# Patient Record
Sex: FEMALE | NOT HISPANIC OR LATINO | URBAN - METROPOLITAN AREA
[De-identification: names, ages, dates, MRNs, and addresses within clinical notes are randomized per-mention and may not be internally consistent; named-entity substitution may affect disease eponyms.]

---

## 2024-10-21 ENCOUNTER — TELEPHONE (OUTPATIENT)
Dept: OBSTETRICS AND GYNECOLOGY | Facility: CLINIC | Age: 34
End: 2024-10-21
Payer: COMMERCIAL

## 2024-10-21 ENCOUNTER — OFFICE VISIT (OUTPATIENT)
Dept: FAMILY MEDICINE | Facility: CLINIC | Age: 34
End: 2024-10-21
Payer: COMMERCIAL

## 2024-10-21 VITALS
HEIGHT: 63 IN | OXYGEN SATURATION: 95 % | HEART RATE: 66 BPM | SYSTOLIC BLOOD PRESSURE: 110 MMHG | WEIGHT: 130.31 LBS | BODY MASS INDEX: 23.09 KG/M2 | DIASTOLIC BLOOD PRESSURE: 72 MMHG

## 2024-10-21 DIAGNOSIS — F32.81 PREMENSTRUAL DYSPHORIC DISORDER: ICD-10-CM

## 2024-10-21 DIAGNOSIS — E34.9 HORMONE IMBALANCE: ICD-10-CM

## 2024-10-21 DIAGNOSIS — N94.6 DYSMENORRHEA: ICD-10-CM

## 2024-10-21 DIAGNOSIS — Z00.00 ENCOUNTER FOR GENERAL ADULT MEDICAL EXAMINATION W/O ABNORMAL FINDINGS: Primary | ICD-10-CM

## 2024-10-21 PROBLEM — Z02.3 ENCOUNTER FOR EXAMINATION FOR RECRUITMENT TO ARMED FORCES: Status: RESOLVED | Noted: 2024-10-21 | Resolved: 2024-10-21

## 2024-10-21 PROBLEM — Z20.2 EXPOSURE TO STD: Status: RESOLVED | Noted: 2024-10-21 | Resolved: 2024-10-21

## 2024-10-21 PROBLEM — M25.579 PAIN IN JOINT, ANKLE AND FOOT: Status: ACTIVE | Noted: 2024-10-21

## 2024-10-21 PROBLEM — M25.569 ARTHRALGIA OF KNEE: Status: ACTIVE | Noted: 2024-10-21

## 2024-10-21 PROBLEM — Z30.41 USES ORAL CONTRACEPTIVES: Status: RESOLVED | Noted: 2024-10-21 | Resolved: 2024-10-21

## 2024-10-21 PROBLEM — H52.209 ASTIGMATISM: Status: ACTIVE | Noted: 2024-10-21

## 2024-10-21 PROBLEM — H52.10 MYOPIA: Status: ACTIVE | Noted: 2024-10-21

## 2024-10-21 PROBLEM — M54.9 UPPER BACK PAIN: Status: RESOLVED | Noted: 2024-10-21 | Resolved: 2024-10-21

## 2024-10-21 PROBLEM — H04.129 DRY EYE SYNDROME: Status: ACTIVE | Noted: 2024-10-21

## 2024-10-21 PROBLEM — R30.0 DYSURIA: Status: RESOLVED | Noted: 2024-10-21 | Resolved: 2024-10-21

## 2024-10-21 PROBLEM — J30.81 ALLERGIC RHINITIS DUE TO ANIMALS: Status: ACTIVE | Noted: 2024-10-21

## 2024-10-21 PROBLEM — F17.201 NICOTINE DEPENDENCE IN REMISSION: Status: RESOLVED | Noted: 2024-10-21 | Resolved: 2024-10-21

## 2024-10-21 PROCEDURE — 99999 PR PBB SHADOW E&M-NEW PATIENT-LVL IV: CPT | Mod: PBBFAC,,,

## 2024-10-21 RX ORDER — LISDEXAMFETAMINE DIMESYLATE 30 MG/1
30 CAPSULE ORAL EVERY MORNING
COMMUNITY

## 2024-10-21 RX ORDER — CETIRIZINE HYDROCHLORIDE 10 MG/1
10 TABLET ORAL DAILY
COMMUNITY

## 2024-10-21 RX ORDER — CLONAZEPAM 1 MG/1
1 TABLET ORAL NIGHTLY PRN
COMMUNITY
Start: 2024-07-23

## 2024-10-21 RX ORDER — AZELASTINE HYDROCHLORIDE 0.5 MG/ML
1 SOLUTION/ DROPS OPHTHALMIC 2 TIMES DAILY
COMMUNITY
Start: 2024-09-04

## 2024-10-21 NOTE — PROGRESS NOTES
Name: Boo Ross  MRN: [id number]  : 1990    Subjective   HPI    Melissa is a pleasant 34 year old female who presents to the clinic to establish care and discuss hormone problems. She reports that she has had hormone imbalance since delivering her twins 2.5 years ago. She cycles through mood swings and finds them to be consistent with her menstrual cycle. Her periods have been irregular since her last delivery. She becomes very fatigued days prior to her period, then cycles through anger and irritability. It is affecting her quality of life. She does not feel she gets enough rest. She has seen an ObGyn and a Psychiatrist that did not help her find a solution. The psychiatrist considered bipolar however she does not feel that she meets manic behavior. She does not want to take medication. She considers endometriosis and possibly abdominal discomfort could worsen her irritability and anger. She is agreeable to see specialist and ObGyn again to consider hormone testing or other solution. She declines psychology or [facility] referral.   Otherwise she is healthy.     Review of Systems    Patient Active Problem List   Diagnosis    Allergic rhinitis due to animals    Dry eye syndrome    Dysmenorrhea    Astigmatism    Pain in joint, ankle and foot    Myopia    Arthralgia of knee       Current Outpatient Medications on File Prior to Visit   Medication Sig Dispense Refill    azelastine (OPTIVAR) 0.05 % ophthalmic solution Place 1 drop into both eyes 2 (two) times daily.      cetirizine (ZYRTEC) 10 MG tablet Take 10 mg by mouth once daily.      clonazePAM (KLONOPIN) 1 MG tablet Take 1 mg by mouth nightly as needed.      lisdexamfetamine (VYVANSE) 30 MG capsule Take 30 mg by mouth every morning.       No current facility-administered medications on file prior to visit.       Past Medical History:   Diagnosis Date    Dysuria 10/21/2024    Encounter for examination for recruitment to [employer] 10/21/2024    Exposure to  STD 10/21/2024    Upper back pain 10/21/2024    Uses oral contraceptives 10/21/2024       No past surgical history on file.     No family history on file.    Social History     Socioeconomic History    Marital status:    Tobacco Use    Smoking status: Never    Smokeless tobacco: Never     Social Drivers of Health     Financial Resource Strain: Low Risk  (10/20/2024)    Overall Financial Resource Strain (CARDIA)     Difficulty of Paying Living Expenses: Not hard at all   Food Insecurity: No Food Insecurity (10/20/2024)    Hunger Vital Sign     Worried About Running Out of Food in the Last Year: Never true     Ran Out of Food in the Last Year: Never true   Physical Activity: Sufficiently Active (10/20/2024)    Exercise Vital Sign     Days of Exercise per Week: 5 days     Minutes of Exercise per Session: 60 min   Stress: Stress Concern Present (10/20/2024)    [employer] - Occupational Stress Questionnaire     Feeling of Stress : To some extent   Housing Stability: Unknown (10/20/2024)    Housing Stability Vital Sign     Unable to Pay for Housing in the Last Year: No       Review of patient's allergies indicates:  No Known Allergies     Health Maintenance Due   Topic Date Due    Hepatitis C Screening  Never done    Cervical Cancer Screening  Never done    Lipid Panel  Never done    HIV Screening  Never done    TETANUS VACCINE  10/03/2018    Influenza Vaccine (1) 09/01/2024    COVID-19 Vaccine (1 - 2024-25 season) Never done       Objective   Vitals:    10/21/24 1300   BP: 110/72   Pulse: 66        Physical Exam  Vitals and nursing note reviewed.   Constitutional:       General: She is not in acute distress.     Appearance: Normal appearance. She is not ill-appearing or toxic-appearing.   HENT:      Head: Normocephalic and atraumatic.      Nose: Nose normal.      Mouth/Throat:      Mouth: Mucous membranes are moist.   Eyes:      Extraocular Movements: Extraocular movements intact.    Cardiovascular:      Rate and Rhythm: Normal rate and regular rhythm.      Heart sounds: Normal heart sounds. No murmur heard.     No friction rub. No gallop.   Pulmonary:      Effort: Pulmonary effort is normal.      Breath sounds: Normal breath sounds. No wheezing, rhonchi or rales.   Abdominal:      General: Bowel sounds are normal.      Tenderness: There is no abdominal tenderness. There is no guarding.   Musculoskeletal:      Cervical back: Neck supple.   Lymphadenopathy:      Cervical: No cervical adenopathy.   Skin:     General: Skin is warm.   Neurological:      Mental Status: She is alert and oriented to person, place, and time.   Psychiatric:         Mood and Affect: Mood normal.         Behavior: Behavior normal.          Assessment & Plan   1. Encounter for general adult medical examination w/o abnormal findings  -     Comprehensive Metabolic Panel; Future; Expected date: 10/21/2024  -     TSH; Future; Expected date: 10/21/2024  -     Hemoglobin A1C; Future; Expected date: 10/21/2024  -     Lipid Panel; Future; Expected date: 10/21/2024  -     CBC Auto Differential; Future; Expected date: 10/21/2024    2. Hormone imbalance  -     Ambulatory referral/consult to Endocrinology; Future; Expected date: 10/28/2024  -     Ambulatory referral/consult to Obstetrics / Gynecology; Future; Expected date: 10/28/2024    3. Dysmenorrhea  -     Ambulatory referral/consult to Endocrinology; Future; Expected date: 10/28/2024  -     Ambulatory referral/consult to Obstetrics / Gynecology; Future; Expected date: 10/28/2024    4. Premenstrual dysphoric disorder  -     Ambulatory referral/consult to Endocrinology; Future; Expected date: 10/28/2024  -     Ambulatory referral/consult to Obstetrics / Gynecology; Future; Expected date: 10/28/2024        Follow up in about 2 months (around 12/21/2024), or if symptoms worsen or fail to improve.    Ana Agustin MD  10/22/2024

## 2024-10-25 ENCOUNTER — LAB VISIT (OUTPATIENT)
Dept: LAB | Facility: HOSPITAL | Age: 34
End: 2024-10-25
Payer: COMMERCIAL

## 2024-10-25 DIAGNOSIS — Z00.00 ENCOUNTER FOR GENERAL ADULT MEDICAL EXAMINATION W/O ABNORMAL FINDINGS: ICD-10-CM

## 2024-10-25 LAB
ALBUMIN SERPL BCP-MCNC: 4.2 G/DL (ref 3.5–5.2)
ALP SERPL-CCNC: 31 U/L (ref 40–150)
ALT SERPL W/O P-5'-P-CCNC: 19 U/L (ref 10–44)
ANION GAP SERPL CALC-SCNC: 9 MMOL/L (ref 8–16)
AST SERPL-CCNC: 20 U/L (ref 10–40)
BASOPHILS # BLD AUTO: 0.02 K/UL (ref 0–0.2)
BASOPHILS NFR BLD: 0.4 % (ref 0–1.9)
BILIRUB SERPL-MCNC: 1 MG/DL (ref 0.1–1)
BUN SERPL-MCNC: 11 MG/DL (ref 6–20)
CALCIUM SERPL-MCNC: 8.8 MG/DL (ref 8.7–10.5)
CHLORIDE SERPL-SCNC: 105 MMOL/L (ref 95–110)
CHOLEST SERPL-MCNC: 125 MG/DL (ref 120–199)
CHOLEST/HDLC SERPL: 2.7 {RATIO} (ref 2–5)
CO2 SERPL-SCNC: 24 MMOL/L (ref 23–29)
CREAT SERPL-MCNC: 0.8 MG/DL (ref 0.5–1.4)
DIFFERENTIAL METHOD BLD: ABNORMAL
EOSINOPHIL # BLD AUTO: 0.2 K/UL (ref 0–0.5)
EOSINOPHIL NFR BLD: 4.1 % (ref 0–8)
ERYTHROCYTE [DISTWIDTH] IN BLOOD BY AUTOMATED COUNT: 12.4 % (ref 11.5–14.5)
EST. GFR  (NO RACE VARIABLE): >60 ML/MIN/1.73 M^2
ESTIMATED AVG GLUCOSE: 88 MG/DL (ref 68–131)
GLUCOSE SERPL-MCNC: 93 MG/DL (ref 70–110)
HBA1C MFR BLD: 4.7 % (ref 4–5.6)
HCT VFR BLD AUTO: 36.9 % (ref 37–48.5)
HDLC SERPL-MCNC: 46 MG/DL (ref 40–75)
HDLC SERPL: 36.8 % (ref 20–50)
HGB BLD-MCNC: 12.8 G/DL (ref 12–16)
IMM GRANULOCYTES # BLD AUTO: 0.03 K/UL (ref 0–0.04)
IMM GRANULOCYTES NFR BLD AUTO: 0.6 % (ref 0–0.5)
LDLC SERPL CALC-MCNC: 65.6 MG/DL (ref 63–159)
LYMPHOCYTES # BLD AUTO: 2.3 K/UL (ref 1–4.8)
LYMPHOCYTES NFR BLD: 45.2 % (ref 18–48)
MCH RBC QN AUTO: 31.8 PG (ref 27–31)
MCHC RBC AUTO-ENTMCNC: 34.7 G/DL (ref 32–36)
MCV RBC AUTO: 92 FL (ref 82–98)
MONOCYTES # BLD AUTO: 0.4 K/UL (ref 0.3–1)
MONOCYTES NFR BLD: 7.9 % (ref 4–15)
NEUTROPHILS # BLD AUTO: 2.2 K/UL (ref 1.8–7.7)
NEUTROPHILS NFR BLD: 41.8 % (ref 38–73)
NONHDLC SERPL-MCNC: 79 MG/DL
NRBC BLD-RTO: 0 /100 WBC
PLATELET # BLD AUTO: 244 K/UL (ref 150–450)
PMV BLD AUTO: 9.1 FL (ref 9.2–12.9)
POTASSIUM SERPL-SCNC: 3.9 MMOL/L (ref 3.5–5.1)
PROT SERPL-MCNC: 7.3 G/DL (ref 6–8.4)
RBC # BLD AUTO: 4.02 M/UL (ref 4–5.4)
SODIUM SERPL-SCNC: 138 MMOL/L (ref 136–145)
TRIGL SERPL-MCNC: 67 MG/DL (ref 30–150)
TSH SERPL DL<=0.005 MIU/L-ACNC: 2.12 UIU/ML (ref 0.4–4)
WBC # BLD AUTO: 5.18 K/UL (ref 3.9–12.7)

## 2024-10-25 PROCEDURE — 85025 COMPLETE CBC W/AUTO DIFF WBC: CPT

## 2024-10-25 PROCEDURE — 84443 ASSAY THYROID STIM HORMONE: CPT

## 2024-10-25 PROCEDURE — 80061 LIPID PANEL: CPT

## 2024-10-25 PROCEDURE — 80053 COMPREHEN METABOLIC PANEL: CPT

## 2024-10-25 PROCEDURE — 36415 COLL VENOUS BLD VENIPUNCTURE: CPT | Mod: PO

## 2024-10-25 PROCEDURE — 83036 HEMOGLOBIN GLYCOSYLATED A1C: CPT

## 2024-11-13 ENCOUNTER — OFFICE VISIT (OUTPATIENT)
Dept: OBSTETRICS AND GYNECOLOGY | Facility: CLINIC | Age: 34
End: 2024-11-13
Payer: COMMERCIAL

## 2024-11-13 VITALS
HEIGHT: 63 IN | DIASTOLIC BLOOD PRESSURE: 60 MMHG | WEIGHT: 130.06 LBS | BODY MASS INDEX: 23.04 KG/M2 | SYSTOLIC BLOOD PRESSURE: 94 MMHG

## 2024-11-13 DIAGNOSIS — F32.81 PREMENSTRUAL DYSPHORIC DISORDER: ICD-10-CM

## 2024-11-13 DIAGNOSIS — Z30.432 ENCOUNTER FOR IUD REMOVAL: Primary | ICD-10-CM

## 2024-11-13 DIAGNOSIS — N94.6 DYSMENORRHEA: ICD-10-CM

## 2024-11-13 DIAGNOSIS — E34.9 HORMONE IMBALANCE: ICD-10-CM

## 2024-11-13 PROCEDURE — 99999 PR PBB SHADOW E&M-EST. PATIENT-LVL III: CPT | Mod: PBBFAC,,, | Performed by: STUDENT IN AN ORGANIZED HEALTH CARE EDUCATION/TRAINING PROGRAM

## 2024-11-13 RX ORDER — DROSPIRENONE AND ESTETROL 3-14.2(28)
1 KIT ORAL DAILY
Qty: 28 TABLET | Refills: 12 | Status: SHIPPED | OUTPATIENT
Start: 2024-11-13

## 2024-11-13 NOTE — PROCEDURES
Removal of IUD    Date/Time: 11/13/2024 2:40 PM    Performed by: Hansa Marshall MD  Authorized by: Hansa Marshall MD    Consent given by:  Patient  Procedure risks and benefits discussed: yes    Patient questions answered: yes    Patient agrees, verbalizes understanding, and wants to proceed: yes    Educational handouts given: yes    Instructions and paperwork completed: yes    Implant grasped by: ring forceps  Removal due to mechanical complications: yes    Removed with no complications: yes

## 2024-11-13 NOTE — PROGRESS NOTES
History & Physical  Gynecology      SUBJECTIVE:     Chief Complaint: Establish Care, Hormone Imbalance, PMDD, and Dysmenorrhea       History of Present Illness:    Here today to discuss heavy and painful periods, IUD, and PMDD.     Periods are regular and heavier since copper IUD. + painful. Interested in copper IUD removal as partner did get vasectomy. Planning vasectomy for contraception    Patient has previously been diagnosed with PMDD. After ovulation through luteal phase and period + rage, mood swings, doesn't like family. Affecting home life and relationship with kids. Has tried CALEB and 2 SRRI. Didn't like side effects with either.       Review of patient's allergies indicates:  No Known Allergies    Past Medical History:   Diagnosis Date    Abnormal Pap smear of cervix     Dysuria 10/21/2024    Encounter for examination for recruitment to [employer] 10/21/2024    Exposure to STD 10/21/2024    Upper back pain 10/21/2024    Uses oral contraceptives 10/21/2024     Past Surgical History:   Procedure Laterality Date    AUGMENTATION OF BREAST Bilateral     LAPAROSCOPY N/A     RHINOPLASTY       OB History          2    Para   2    Term   2            AB        Living   3         SAB        IAB        Ectopic        Multiple   1    Live Births   3               Family History   Problem Relation Name Age of Onset    Breast cancer Paternal Aunt      Colon cancer Neg Hx      Ovarian cancer Neg Hx      Uterine cancer Neg Hx      Cervical cancer Neg Hx       Social History     Tobacco Use    Smoking status: Never    Smokeless tobacco: Never   Substance Use Topics    Alcohol use: Yes     Comment: socially    Drug use: Never       Current Outpatient Medications   Medication Sig    azelastine (OPTIVAR) 0.05 % ophthalmic solution Place 1 drop into both eyes 2 (two) times daily.    cetirizine (ZYRTEC) 10 MG tablet Take 10 mg by mouth once daily.    clonazePAM (KLONOPIN) 1 MG tablet Take 1 mg by mouth nightly  as needed.    lisdexamfetamine (VYVANSE) 30 MG capsule Take 30 mg by mouth every morning.    drospirenone-estetrol (NEXTSTELLIS) 3 mg- 14.2 mg (28) Tab Take 1 each by mouth Daily.     No current facility-administered medications for this visit.         Review of Systems:  Review of Systems   Constitutional:  Negative for chills, fatigue and fever.   HENT:  Negative for congestion.    Eyes:  Negative for visual disturbance.   Respiratory:  Negative for cough and shortness of breath.    Cardiovascular:  Negative for chest pain and palpitations.   Gastrointestinal:  Negative for abdominal distention, abdominal pain, constipation, diarrhea, nausea and vomiting.   Genitourinary:  Negative for difficulty urinating, dysuria, hematuria, vaginal bleeding and vaginal discharge.   Skin:  Negative for rash.   Neurological:  Negative for dizziness, seizures, light-headedness and headaches.   Hematological:  Does not bruise/bleed easily.   Psychiatric/Behavioral:  Negative for dysphoric mood. The patient is not nervous/anxious.         OBJECTIVE:     Physical Exam:  Physical Exam  Vitals reviewed.   Constitutional:       General: She is not in acute distress.     Appearance: Normal appearance. She is well-developed.   HENT:      Head: Normocephalic and atraumatic.   Cardiovascular:      Rate and Rhythm: Normal rate and regular rhythm.   Pulmonary:      Effort: Pulmonary effort is normal.   Abdominal:      General: There is no distension.      Palpations: Abdomen is soft.   Genitourinary:     Vagina: Normal.      Comments: Normal external female genitalia, normal hair distribution. Vaginal mucosa pink, moist, well rugated, scant white physiologic discharge. No blood in vault. Cervix pink, non-friable, without lesion.  Skin:     General: Skin is warm.   Neurological:      Mental Status: She is alert and oriented to person, place, and time.   Psychiatric:         Behavior: Behavior normal.         Thought Content: Thought content  normal.         Judgment: Judgment normal.           ASSESSMENT:       ICD-10-CM ICD-9-CM    1. Encounter for IUD removal  Z30.432 V25.12       2. Hormone imbalance  E34.9 259.9 Ambulatory referral/consult to Obstetrics / Gynecology      drospirenone-estetrol (NEXTSTELLIS) 3 mg- 14.2 mg (28) Tab      3. Dysmenorrhea  N94.6 625.3 Ambulatory referral/consult to Obstetrics / Gynecology      drospirenone-estetrol (NEXTSTELLIS) 3 mg- 14.2 mg (28) Tab      4. Premenstrual dysphoric disorder  F32.81 625.4 Ambulatory referral/consult to Obstetrics / Gynecology      drospirenone-estetrol (NEXTSTELLIS) 3 mg- 14.2 mg (28) Tab          No orders of the defined types were placed in this encounter.          Plan:      IUD removed see procedure note    PMDD  - long discussion with patient regarding hormonal options vs non hormonal options for PMDD  - can consider OCPs, IUD, orlissa, myfembee.  - had questions about endometrial ablation, answered  - discussed chasteberry, calcium , magnesium bisgycinate , anti inflammatory plant based diet  - r/b/a dicussed, will try nexstelllis continously x 3 months    F/u in 3 months    aHnsa Marshall M.D.  Obstetrics and Gynecology

## 2025-01-31 ENCOUNTER — PATIENT MESSAGE (OUTPATIENT)
Dept: OBSTETRICS AND GYNECOLOGY | Facility: CLINIC | Age: 35
End: 2025-01-31
Payer: COMMERCIAL

## 2025-01-31 DIAGNOSIS — E34.9 HORMONE IMBALANCE: ICD-10-CM

## 2025-01-31 DIAGNOSIS — F32.81 PREMENSTRUAL DYSPHORIC DISORDER: ICD-10-CM

## 2025-01-31 DIAGNOSIS — N94.6 DYSMENORRHEA: ICD-10-CM

## 2025-02-10 ENCOUNTER — OFFICE VISIT (OUTPATIENT)
Dept: ENDOCRINOLOGY | Facility: CLINIC | Age: 35
End: 2025-02-10
Payer: COMMERCIAL

## 2025-02-10 DIAGNOSIS — F32.81 PREMENSTRUAL DYSPHORIC DISORDER: ICD-10-CM

## 2025-02-10 DIAGNOSIS — E34.9 HORMONE IMBALANCE: ICD-10-CM

## 2025-02-10 DIAGNOSIS — R23.2 FLUSHING: Primary | ICD-10-CM

## 2025-02-10 DIAGNOSIS — N94.6 DYSMENORRHEA: ICD-10-CM

## 2025-02-10 PROBLEM — O30.043 DICHORIONIC DIAMNIOTIC TWIN PREGNANCY IN THIRD TRIMESTER: Status: ACTIVE | Noted: 2022-05-13

## 2025-02-10 PROBLEM — Z98.82 H/O BREAST AUGMENTATION: Status: ACTIVE | Noted: 2020-03-16

## 2025-02-10 NOTE — PROGRESS NOTES
The patient location is: Home    Visit type: audiovisual    Face to Face time with patient: 20 mins    45 minutes of total time spent on the encounter, which includes face to face time and non-face to face time preparing to see the patient (eg, review of tests), Obtaining and/or reviewing separately obtained history, Documenting clinical information in the electronic or other health record, Independently interpreting results (not separately reported) and communicating results to the patient/family/caregiver, or Care coordination (not separately reported).     Each patient to whom he or she provides medical services by telemedicine is:  (1) informed of the relationship between the physician and patient and the respective role of any other health care provider with respect to management of the patient; and (2) notified that he or she may decline to receive medical services by telemedicine and may withdraw from such care at any time.    Notes:      Subjective:      Patient ID: Sandra Lauren is referred by Mary Beth Paul MD     Chief Complaint:  PMDD, Dysmenorrhea, hormone imbalance.     History of Present Illness    Sandra Lauren is a 34 y.o. female who presents for evaluation of PMDD, Dysmenorrhea, hormone imbalance.     Seen OBGYN: H/o heavy and painful periods, IUD, and PMDD.   Periods were regular and heavier since copper IUD with dysmenorrhea. Had copper IUD removal as partner did get vasectomy.      Patient has previously been diagnosed with PMDD at 14 years. Has tried CALEB and 2 SRRI, d/c due to side effects.   Was prescribed OCP by her obgyn.     Patient says the IUD removal helped with the pain.   She has panic attacks/anxiety during her periods.   H/o ADHD   On Vyvanse during the weekdays  Klonipin as needed.    H/o chronic fatigue, feels bloated.   Reports diarrhea/loose bowels, night sweats/flushing alta. during her cycles.        Lab Results   Component Value Date    TSH 2.120 10/25/2024        Lipid panel    Lab Results   Component Value Date    CHOL 125 10/25/2024    TRIG 67 10/25/2024    HDL 46 10/25/2024    LDLCALC 65.6 10/25/2024    CHOLHDL 36.8 10/25/2024        Family Hx:   Diabetes: Maternal GM  Brother: Melanoma    Pre diabetes    Lab Results   Component Value Date    HGBA1C 4.7 10/25/2024         ROS:   As above    Objective:     There were no vitals taken for this visit.  There is no height or weight on file to calculate BMI.    Physical Exam  Constitutional:       General: She is not in acute distress.     Appearance: She is not ill-appearing.   Eyes:      Conjunctiva/sclera: Conjunctivae normal.   Pulmonary:      Effort: Pulmonary effort is normal.   Neurological:      Mental Status: She is alert and oriented to person, place, and time.           Lab Review:   Lab Results   Component Value Date    HGBA1C 4.7 10/25/2024     Lab Results   Component Value Date    CHOL 125 10/25/2024    HDL 46 10/25/2024    LDLCALC 65.6 10/25/2024    TRIG 67 10/25/2024    CHOLHDL 36.8 10/25/2024     Lab Results   Component Value Date     10/25/2024    K 3.9 10/25/2024     10/25/2024    CO2 24 10/25/2024    GLU 93 10/25/2024    BUN 11 10/25/2024    CREATININE 0.8 10/25/2024    CALCIUM 8.8 10/25/2024    PROT 7.3 10/25/2024    ALBUMIN 4.2 10/25/2024    BILITOT 1.0 10/25/2024    ALKPHOS 31 (L) 10/25/2024    AST 20 10/25/2024    ALT 19 10/25/2024    ANIONGAP 9 10/25/2024    EGFRNORACEVR >60.0 10/25/2024    TSH 2.120 10/25/2024        Assessment and Plan     1. Premenstrual dysphoric disorder  Assessment & Plan:    Continue follow up with OBGYN.       Orders:  -     Ambulatory referral/consult to Endocrinology    2. Hormone imbalance  Assessment & Plan:    Patient reports menorrhagia, dysmenorrhea, PMDD, anxiety.   Has tried CALEB, SSRI but had side effects.   Wants to know if there is any other evaluation or options for her PMDD.   Currently on OCP, takes vyvanse on weekdays and klonopin as needed.    Reports chronic fatigue, loose stools, night sweats, flushing alta during her cycles.   Discussed checking her TFTs, metanephrine and cortisol.       Orders:  -     Ambulatory referral/consult to Endocrinology    3. Dysmenorrhea  Assessment & Plan:    Reports some improvment after IUD removal.   On OCP.   Follow up with obgyn.       Orders:  -     Ambulatory referral/consult to Endocrinology       Follow up after labs.     Macie DRAPER Rai, MD

## 2025-02-11 RX ORDER — DROSPIRENONE AND ESTETROL 3-14.2(28)
1 KIT ORAL DAILY
Qty: 28 TABLET | Refills: 12 | Status: SHIPPED | OUTPATIENT
Start: 2025-02-11

## 2025-02-16 PROBLEM — E34.9 HORMONE IMBALANCE: Status: ACTIVE | Noted: 2025-02-16

## 2025-02-16 PROBLEM — F32.81 PREMENSTRUAL DYSPHORIC DISORDER: Status: ACTIVE | Noted: 2025-02-16

## 2025-02-16 NOTE — ASSESSMENT & PLAN NOTE
Patient reports menorrhagia, dysmenorrhea, PMDD, anxiety.   Has tried CALEB, SSRI but had side effects.   Wants to know if there is any other evaluation or options for her PMDD.   Currently on OCP, takes vyvanse on weekdays and klonopin as needed.   Reports chronic fatigue, loose stools, night sweats, flushing alta during her cycles.   Discussed checking her TFTs, metanephrine and cortisol.

## 2025-02-18 ENCOUNTER — LAB VISIT (OUTPATIENT)
Dept: LAB | Facility: HOSPITAL | Age: 35
End: 2025-02-18
Attending: INTERNAL MEDICINE
Payer: COMMERCIAL

## 2025-02-18 DIAGNOSIS — R23.2 FLUSHING: ICD-10-CM

## 2025-02-18 LAB
CORTIS SERPL-MCNC: 14.3 UG/DL (ref 4.3–22.4)
T4 FREE SERPL-MCNC: 1.02 NG/DL (ref 0.71–1.51)
TSH SERPL DL<=0.005 MIU/L-ACNC: 2.87 UIU/ML (ref 0.4–4)

## 2025-02-18 PROCEDURE — 84443 ASSAY THYROID STIM HORMONE: CPT | Performed by: INTERNAL MEDICINE

## 2025-02-18 PROCEDURE — 83835 ASSAY OF METANEPHRINES: CPT | Performed by: INTERNAL MEDICINE

## 2025-02-18 PROCEDURE — 84439 ASSAY OF FREE THYROXINE: CPT | Performed by: INTERNAL MEDICINE

## 2025-02-18 PROCEDURE — 82533 TOTAL CORTISOL: CPT | Performed by: INTERNAL MEDICINE

## 2025-02-20 ENCOUNTER — OFFICE VISIT (OUTPATIENT)
Dept: OBSTETRICS AND GYNECOLOGY | Facility: CLINIC | Age: 35
End: 2025-02-20
Payer: COMMERCIAL

## 2025-02-20 VITALS — DIASTOLIC BLOOD PRESSURE: 72 MMHG | WEIGHT: 132.5 LBS | SYSTOLIC BLOOD PRESSURE: 106 MMHG | BODY MASS INDEX: 23.47 KG/M2

## 2025-02-20 DIAGNOSIS — F32.81 PMDD (PREMENSTRUAL DYSPHORIC DISORDER): Primary | ICD-10-CM

## 2025-02-20 RX ORDER — PROGESTERONE 200 MG/1
200 CAPSULE ORAL NIGHTLY
Qty: 15 CAPSULE | Refills: 4 | Status: SHIPPED | OUTPATIENT
Start: 2025-02-20 | End: 2025-03-07

## 2025-02-20 NOTE — PROGRESS NOTES
"History & Physical  Gynecology      SUBJECTIVE:     Chief Complaint: Birth Control Follow-Up  (Patient states that she is no longer taking BC )       History of Present Illness:    Here today for f/u of PMDD and luteal phase issues. Took 1 month of birth control and felt crazy hormonal and worse mood wise, stopped it. Still having issues especially after ovulation.     Previous HPI  Patient has previously been diagnosed with PMDD. After ovulation through luteal phase and period + rage, mood swings, doesn't like family. Affecting home life and relationship with kids. Has tried CALEB and 2 SRRI. Didn't like side effects with either. "Mommy only has 2 good weeks"      Review of patient's allergies indicates:   Allergen Reactions    Cat/feline products Hives, Itching, Rash and Shortness Of Breath       Past Medical History:   Diagnosis Date    Abnormal Pap smear of cervix     Dysuria 10/21/2024    Encounter for examination for recruitment to [employer] 10/21/2024    Exposure to STD 10/21/2024    Hormone imbalance 2025    Upper back pain 10/21/2024    Uses oral contraceptives 10/21/2024     Past Surgical History:   Procedure Laterality Date    AUGMENTATION OF BREAST Bilateral     LAPAROSCOPY N/A     RHINOPLASTY       OB History          2    Para   2    Term   2            AB        Living   3         SAB        IAB        Ectopic        Multiple   1    Live Births   3               Family History   Problem Relation Name Age of Onset    Breast cancer Paternal Aunt      Colon cancer Neg Hx      Ovarian cancer Neg Hx      Uterine cancer Neg Hx      Cervical cancer Neg Hx       Social History[1]    Current Outpatient Medications   Medication Sig    azelastine (OPTIVAR) 0.05 % ophthalmic solution Place 1 drop into both eyes 2 (two) times daily.    cetirizine (ZYRTEC) 10 MG tablet Take 10 mg by mouth once daily.    clonazePAM (KLONOPIN) 1 MG tablet Take 1 mg by mouth nightly as needed.    lisdexamfetamine " (VYVANSE) 30 MG capsule Take 30 mg by mouth every morning.    drospirenone-estetrol (NEXTSTELLIS) 3 mg- 14.2 mg (28) Tab Take 1 each by mouth Daily. (Patient not taking: Reported on 2/20/2025)    progesterone (PROMETRIUM) 200 MG capsule Take 1 capsule (200 mg total) by mouth nightly. for 15 days     No current facility-administered medications for this visit.         Review of Systems:  Review of Systems   Constitutional:  Negative for chills, fatigue and fever.   HENT:  Negative for congestion.    Eyes:  Negative for visual disturbance.   Respiratory:  Negative for cough and shortness of breath.    Cardiovascular:  Negative for chest pain and palpitations.   Gastrointestinal:  Negative for abdominal distention, abdominal pain, constipation, diarrhea, nausea and vomiting.   Genitourinary:  Negative for difficulty urinating, dysuria, hematuria, vaginal bleeding and vaginal discharge.   Skin:  Negative for rash.   Neurological:  Negative for dizziness, seizures, light-headedness and headaches.   Hematological:  Does not bruise/bleed easily.   Psychiatric/Behavioral:  Negative for dysphoric mood. The patient is not nervous/anxious.         OBJECTIVE:     Physical Exam:  Physical Exam  Vitals reviewed.   Constitutional:       General: She is not in acute distress.     Appearance: Normal appearance. She is well-developed.   HENT:      Head: Normocephalic and atraumatic.   Cardiovascular:      Rate and Rhythm: Normal rate and regular rhythm.   Pulmonary:      Effort: Pulmonary effort is normal.   Abdominal:      General: There is no distension.      Palpations: Abdomen is soft.   Genitourinary:     Vagina: Normal.   Skin:     General: Skin is warm.   Neurological:      Mental Status: She is alert and oriented to person, place, and time.   Psychiatric:         Behavior: Behavior normal.         Thought Content: Thought content normal.         Judgment: Judgment normal.           ASSESSMENT:       ICD-10-CM ICD-9-CM    1.  PMDD (premenstrual dysphoric disorder)  F32.81 625.4 progesterone (PROMETRIUM) 200 MG capsule          No orders of the defined types were placed in this encounter.          Plan:   - pain has improved since removing copper IUD  - has failed OCPs and SSRI  - discussed options including cyclic progesterone, orlissa, depo lupron, possibility oophrectomy if +improvement with either orlissa or depo lupron  - opts for cyclic progesterone, ordered    F/u in 3 months    Hansa Marshall M.D.  Obstetrics and Gynecology            [1]   Social History  Tobacco Use    Smoking status: Never    Smokeless tobacco: Never   Substance Use Topics    Alcohol use: Yes     Comment: socially    Drug use: Never

## 2025-02-27 LAB
METANEPH FREE SERPL-MCNC: 62 PG/ML
METANEPHS SERPL-MCNC: 155 PG/ML
NORMETANEPH FREE SERPL-MCNC: 93 PG/ML

## 2025-02-28 DIAGNOSIS — F32.81 PMDD (PREMENSTRUAL DYSPHORIC DISORDER): ICD-10-CM

## 2025-02-28 NOTE — TELEPHONE ENCOUNTER
Refill Routing Note   Medication(s) are not appropriate for processing by [facility] for the following reason(s):        New or recently adjusted medication    [facility] action(s):  Defer        Medication Therapy Plan: New start (2/20/25)      Appointments  past 12m or future 3m with PCP    Date Provider   Last Visit   2/20/2025 Hansa Marshall MD   Next Visit   5/20/2025 Hansa Marshall MD   ED visits in past 90 days: 0        Note composed:2:04 PM 02/28/2025

## 2025-03-10 ENCOUNTER — PATIENT MESSAGE (OUTPATIENT)
Dept: ENDOCRINOLOGY | Facility: CLINIC | Age: 35
End: 2025-03-10
Payer: COMMERCIAL

## 2025-03-10 RX ORDER — PROGESTERONE 200 MG/1
200 CAPSULE ORAL NIGHTLY
Qty: 90 CAPSULE | Refills: 3 | Status: SHIPPED | OUTPATIENT
Start: 2025-03-10 | End: 2025-03-25

## 2025-05-20 ENCOUNTER — OFFICE VISIT (OUTPATIENT)
Dept: OBSTETRICS AND GYNECOLOGY | Facility: CLINIC | Age: 35
End: 2025-05-20
Payer: COMMERCIAL

## 2025-05-20 VITALS
SYSTOLIC BLOOD PRESSURE: 112 MMHG | BODY MASS INDEX: 23.01 KG/M2 | HEIGHT: 63 IN | DIASTOLIC BLOOD PRESSURE: 64 MMHG | WEIGHT: 129.88 LBS

## 2025-05-20 DIAGNOSIS — F32.81 PMDD (PREMENSTRUAL DYSPHORIC DISORDER): Primary | ICD-10-CM

## 2025-05-20 DIAGNOSIS — R68.82 LOW LIBIDO: ICD-10-CM

## 2025-05-20 PROCEDURE — 3008F BODY MASS INDEX DOCD: CPT | Mod: CPTII,S$GLB,, | Performed by: STUDENT IN AN ORGANIZED HEALTH CARE EDUCATION/TRAINING PROGRAM

## 2025-05-20 PROCEDURE — 3074F SYST BP LT 130 MM HG: CPT | Mod: CPTII,S$GLB,, | Performed by: STUDENT IN AN ORGANIZED HEALTH CARE EDUCATION/TRAINING PROGRAM

## 2025-05-20 PROCEDURE — 99999 PR PBB SHADOW E&M-EST. PATIENT-LVL III: CPT | Mod: PBBFAC,,, | Performed by: STUDENT IN AN ORGANIZED HEALTH CARE EDUCATION/TRAINING PROGRAM

## 2025-05-20 PROCEDURE — 3078F DIAST BP <80 MM HG: CPT | Mod: CPTII,S$GLB,, | Performed by: STUDENT IN AN ORGANIZED HEALTH CARE EDUCATION/TRAINING PROGRAM

## 2025-05-20 PROCEDURE — 99214 OFFICE O/P EST MOD 30 MIN: CPT | Mod: S$GLB,,, | Performed by: STUDENT IN AN ORGANIZED HEALTH CARE EDUCATION/TRAINING PROGRAM

## 2025-05-20 PROCEDURE — 1159F MED LIST DOCD IN RCRD: CPT | Mod: CPTII,S$GLB,, | Performed by: STUDENT IN AN ORGANIZED HEALTH CARE EDUCATION/TRAINING PROGRAM

## 2025-05-20 RX ORDER — AMOXICILLIN 500 MG/1
500 CAPSULE ORAL
COMMUNITY

## 2025-05-20 RX ORDER — PROGESTERONE 200 MG/1
200 CAPSULE ORAL NIGHTLY
Qty: 90 CAPSULE | Refills: 3 | Status: SHIPPED | OUTPATIENT
Start: 2025-05-20 | End: 2025-06-04

## 2025-05-20 NOTE — PROGRESS NOTES
History & Physical  Gynecology      SUBJECTIVE:     Chief Complaint: No chief complaint on file.       History of Present Illness:    Here today to f/u of cyclic progesterone for PMDD. Doing well, feeling better on it. Feels like can tolerate mood symptoms better    Periods still regular    Has questions about low libido. Able to enjoy intimacy and orgasm. No pain with sex.     Review of patient's allergies indicates:   Allergen Reactions    Cat/feline products Hives, Itching, Rash and Shortness Of Breath       Past Medical History:   Diagnosis Date    Abnormal Pap smear of cervix     Dysuria 10/21/2024    Encounter for examination for recruitment to [facility] 10/21/2024    Exposure to STD 10/21/2024    Hormone imbalance 2025    Upper back pain 10/21/2024    Uses oral contraceptives 10/21/2024     Past Surgical History:   Procedure Laterality Date    AUGMENTATION OF BREAST Bilateral     LAPAROSCOPY N/A     RHINOPLASTY       OB History          2    Para   2    Term   2            AB        Living   3         SAB        IAB        Ectopic        Multiple   1    Live Births   3               Family History   Problem Relation Name Age of Onset    Breast cancer Paternal Aunt      Colon cancer Neg Hx      Ovarian cancer Neg Hx      Uterine cancer Neg Hx      Cervical cancer Neg Hx       Social History[1]    Current Outpatient Medications   Medication Sig    amoxicillin (AMOXIL) 500 MG capsule Take 500 mg by mouth every 8 (eight) hours.    azelastine (OPTIVAR) 0.05 % ophthalmic solution Place 1 drop into both eyes 2 (two) times daily.    cetirizine (ZYRTEC) 10 MG tablet Take 10 mg by mouth once daily.    clonazePAM (KLONOPIN) 1 MG tablet Take 1 mg by mouth nightly as needed.    lisdexamfetamine (VYVANSE) 30 MG capsule Take 30 mg by mouth every morning.    drospirenone-estetrol (NEXTSTELLIS) 3 mg- 14.2 mg (28) Tab Take 1 each by mouth Daily. (Patient not taking: Reported on 2025)    progesterone  (PROMETRIUM) 200 MG capsule Take 1 capsule (200 mg total) by mouth nightly. for 15 days     No current facility-administered medications for this visit.         Review of Systems:  Review of Systems   Constitutional:  Negative for chills, fatigue and fever.   HENT:  Negative for congestion.    Eyes:  Negative for visual disturbance.   Respiratory:  Negative for cough and shortness of breath.    Cardiovascular:  Negative for chest pain and palpitations.   Gastrointestinal:  Negative for abdominal distention, abdominal pain, constipation, diarrhea, nausea and vomiting.   Genitourinary:  Negative for difficulty urinating, dysuria, hematuria, vaginal bleeding and vaginal discharge.   Skin:  Negative for rash.   Neurological:  Negative for dizziness, seizures, light-headedness and headaches.   Hematological:  Does not bruise/bleed easily.   Psychiatric/Behavioral:  Negative for dysphoric mood. The patient is not nervous/anxious.         OBJECTIVE:     Physical Exam:  Physical Exam  Vitals reviewed.   Constitutional:       General: She is not in acute distress.     Appearance: Normal appearance. She is well-developed.   HENT:      Head: Normocephalic and atraumatic.   Cardiovascular:      Rate and Rhythm: Normal rate and regular rhythm.   Pulmonary:      Effort: Pulmonary effort is normal.   Abdominal:      General: There is no distension.      Palpations: Abdomen is soft.   Genitourinary:     Vagina: Normal.   Skin:     General: Skin is warm.   Neurological:      Mental Status: She is alert and oriented to person, place, and time.   Psychiatric:         Behavior: Behavior normal.         Thought Content: Thought content normal.         Judgment: Judgment normal.           ASSESSMENT:       ICD-10-CM ICD-9-CM    1. PMDD (premenstrual dysphoric disorder)  F32.81 625.4 progesterone (PROMETRIUM) 200 MG capsule      2. Low libido  R68.82 799.81           No orders of the defined types were placed in this encounter.         "  Plan:      - continue prometrium  - discussed if no improvement can consider orlissa vs depo lupron  - - Long discussion with patient about normal sex drive as we age. Discussed sex drive in women is usually multifactorial with social and other psychological stressors. Discussed eliminating stressors or acknowledging that sometimes we need to stop "thinking about everything else" to get into mood for sex-this is normal for women. We discussed being able to orgasm alone and how patient can still enjoy sex. I Discussed party analogy with party, if can still go and have a good time, all that matters.  Discussed planning sex can sometimes help and lead to increase desire. Discussed "[employer] jesus, erotica, sex therapy. Depending on situation recommended certain books including becoming cliorate, come as you are, etc. We discussed transdermal testosterone and side effect profile. Discussed needs update EKG/lipid profile before starting testosterone for sex drive. Discussed other HRT can sometimes help with libido as well. All questions answered.   - recommend [email or web address]    Hansa Marshall M.D.  Obstetrics and Gynecology              [1]   Social History  Tobacco Use    Smoking status: Never    Smokeless tobacco: Never   Substance Use Topics    Alcohol use: Yes     Comment: socially    Drug use: Never     "